# Patient Record
Sex: FEMALE | Race: OTHER
[De-identification: names, ages, dates, MRNs, and addresses within clinical notes are randomized per-mention and may not be internally consistent; named-entity substitution may affect disease eponyms.]

---

## 2022-04-26 PROBLEM — Z00.00 ENCOUNTER FOR PREVENTIVE HEALTH EXAMINATION: Status: ACTIVE | Noted: 2022-04-26

## 2022-05-31 ENCOUNTER — NON-APPOINTMENT (OUTPATIENT)
Age: 77
End: 2022-05-31

## 2022-05-31 ENCOUNTER — APPOINTMENT (OUTPATIENT)
Dept: CARDIOLOGY | Facility: CLINIC | Age: 77
End: 2022-05-31
Payer: MEDICARE

## 2022-05-31 VITALS
SYSTOLIC BLOOD PRESSURE: 114 MMHG | WEIGHT: 106 LBS | DIASTOLIC BLOOD PRESSURE: 60 MMHG | OXYGEN SATURATION: 97 % | HEIGHT: 67 IN | BODY MASS INDEX: 16.64 KG/M2 | HEART RATE: 80 BPM | TEMPERATURE: 97.6 F

## 2022-05-31 VITALS — DIASTOLIC BLOOD PRESSURE: 58 MMHG | SYSTOLIC BLOOD PRESSURE: 110 MMHG

## 2022-05-31 DIAGNOSIS — Z82.49 FAMILY HISTORY OF ISCHEMIC HEART DISEASE AND OTHER DISEASES OF THE CIRCULATORY SYSTEM: ICD-10-CM

## 2022-05-31 DIAGNOSIS — Z78.9 OTHER SPECIFIED HEALTH STATUS: ICD-10-CM

## 2022-05-31 DIAGNOSIS — Z80.0 FAMILY HISTORY OF MALIGNANT NEOPLASM OF DIGESTIVE ORGANS: ICD-10-CM

## 2022-05-31 PROCEDURE — 93242 EXT ECG>48HR<7D RECORDING: CPT

## 2022-05-31 PROCEDURE — 93000 ELECTROCARDIOGRAM COMPLETE: CPT | Mod: 59

## 2022-05-31 PROCEDURE — 99204 OFFICE O/P NEW MOD 45 MIN: CPT | Mod: 25

## 2022-05-31 RX ORDER — ASCORBIC ACID 500 MG
TABLET ORAL DAILY
Refills: 0 | Status: ACTIVE | COMMUNITY

## 2022-05-31 RX ORDER — UBIDECARENONE/VIT E ACET 100MG-5
CAPSULE ORAL DAILY
Refills: 0 | Status: ACTIVE | COMMUNITY

## 2022-06-01 NOTE — ASSESSMENT
[FreeTextEntry1] : Lower extremity swelling discoloration.  \par Atrial fibrillation.  Unclear duration.  \par Prior chest pain syndrome.  \par Discussed diagnosis natural history and pathophysiology of atrial fibrillation.  \par Monitor applied to evaluate heart rate.  \par Echocardiogram to evaluate resting cardiac structure and function. \par Anticoagulation.  [id number] score elevated due to age and gender.  Instructed to call if bleeding issues. \par Requested blood work.   \par Emphasized importance of medication compliance to reduce long-term cardiovascular risk, e.g. CVA\par Instructed to call if adverse effect.  \par Clinical follow-up will be scheduled after echocardiogram monitor and blood work.  \par Eventual ischemic evaluation.  Possible rhythm control based on above

## 2022-06-01 NOTE — HISTORY OF PRESENT ILLNESS
[FreeTextEntry1] : LONNY BILLS  is a 77 year old  F\par \par Presents today for initial cardiology consultation.  \par She reports that she is a "health freak".  She has not had no prior cardiovascular evaluation.  \par She had a remote appendectomy and tonsillectomy.  \par She does not take any medications.  \par There is no prior history of a coronary revascularization. \par There is no history of symptomatic congestive heart failure rheumatic heart disease or valvular disease.\par There is no significant family history.  \par \par She presents today with swelling and discoloration of her ankles.  She did an Internet search and was concerned about the development of heart failure.  \par Several years ago she thought she may have had a prior MI.  At that time she had chest discomfort and felt unable to breathe.  She saw an acupuncturist and had improvement in her symptoms.  \par \par At baseline she remains active walking doing elliptical and Nordic track up to 60 minutes.\par There is no exertional chest pain, pressure or discomfort. \par There is no significant dyspnea on exertion or orthopnea. \par There are no symptomatic palpitations, lightheadedness, dizziness or syncope.\par \par She works as a .  \par \par Today's EKG demonstrates atrial fibrillation.  This is rate controlled.  \par \par .

## 2022-06-08 ENCOUNTER — APPOINTMENT (OUTPATIENT)
Dept: CARDIOLOGY | Facility: CLINIC | Age: 77
End: 2022-06-08
Payer: MEDICARE

## 2022-06-08 PROCEDURE — 93306 TTE W/DOPPLER COMPLETE: CPT

## 2022-06-14 ENCOUNTER — APPOINTMENT (OUTPATIENT)
Dept: CARDIOLOGY | Facility: CLINIC | Age: 77
End: 2022-06-14

## 2022-06-14 PROCEDURE — 93244 EXT ECG>48HR<7D REV&INTERPJ: CPT

## 2022-06-20 ENCOUNTER — APPOINTMENT (OUTPATIENT)
Dept: CARDIOLOGY | Facility: CLINIC | Age: 77
End: 2022-06-20
Payer: MEDICARE

## 2022-06-20 VITALS
OXYGEN SATURATION: 99 % | HEIGHT: 67 IN | WEIGHT: 109 LBS | TEMPERATURE: 97.3 F | SYSTOLIC BLOOD PRESSURE: 100 MMHG | DIASTOLIC BLOOD PRESSURE: 50 MMHG | RESPIRATION RATE: 14 BRPM | HEART RATE: 67 BPM | BODY MASS INDEX: 17.11 KG/M2

## 2022-06-20 DIAGNOSIS — I48.92 UNSPECIFIED ATRIAL FIBRILLATION: ICD-10-CM

## 2022-06-20 DIAGNOSIS — I48.91 UNSPECIFIED ATRIAL FIBRILLATION: ICD-10-CM

## 2022-06-20 PROCEDURE — 99215 OFFICE O/P EST HI 40 MIN: CPT

## 2022-06-20 RX ORDER — METHYLDOPA/HYDROCHLOROTHIAZIDE 250MG-15MG
TABLET ORAL DAILY
Refills: 0 | Status: DISCONTINUED | COMMUNITY
End: 2022-06-20

## 2022-06-21 NOTE — HISTORY OF PRESENT ILLNESS
[FreeTextEntry1] : LONNY BILLS  is a 77 year old  F\par \par Presents today for initial cardiology consultation.  \par She reports that she is a "health freak".  She has not had no prior cardiovascular evaluation.  \par She had a remote appendectomy and tonsillectomy.  \par She does not take any medications.  \par There is no prior history of a coronary revascularization. \par There is no history of symptomatic congestive heart failure rheumatic heart disease or valvular disease.\par There is no significant family history.  \par \par She presents today with swelling and discoloration of her ankles.  She did an Internet search and was concerned about the development of heart failure.  On exam she has varicose veins. This has interim resolved on its own. \par Several years ago she thought she may have had a prior MI.  At that time she had chest discomfort and felt unable to breathe.  She saw an acupuncturist and had improvement in her symptoms.  \par \par At baseline she remains active walking doing elliptical and Nordic track up to 60 minutes.\par There is no exertional chest pain, pressure or discomfort. \par There is no significant dyspnea on exertion or orthopnea. \par There are no symptomatic palpitations, lightheadedness, dizziness or syncope.\par \par She works as a .  \par She sees a homeopathic practitioner routinely. She is averse to Western medicine. \par \par EKG demonstrates atrial fibrillation.  This is rate controlled.  \par Echo 6/8/22 EF 60% moderate MR, LAE\par 7 day monitor May 2022 persistent AFL avg 71 bpm. rare PVCs. \par Labs June 6, 2022 K4.8 creatinine 0.7 alk phos 127 AST 40 HDL 73 LDL 98  TSH 3.9 A1c 5.2  hemoglobin 14.6 platelets 102, LP(a) within normal limits\par

## 2022-06-21 NOTE — ASSESSMENT
[FreeTextEntry1] : LONNY BILLS is a 77 year old F who presents today Jun 20, 2022 with the above history and the following active issues: \par \par Lower extremity swelling discoloration.  \par Varicose veins on exam. \par Plans to see vascular speciailist for further eval. \par There is no clinical evidence of CHF. LVEF is normal on echo. \par Low salt, LE elevation/compression PRN. \par \par Atrial fibrillation. Persistent atrial flutter noted.  Unclear duration.  \par Discussed diagnosis natural history and pathophysiology of atrial fibrillation.  \par Rate is controlled. \par There is normal LV systolic function but mod LAE. \par Extensive discussion re rate vs rhythm control strategy, recommend EP consult to discuss ablation strategy given higher success rate in the setting of atrial flutter. \par [id number] score elevated due to age and gender.  Discussed elevated risk of CVA/VTE without use of AC. Discussed risks, benefits, and alternatives reviewed. DOAC v NOAC. \par She declines additional medications at this time. Declines EP eval or AC. Wishes to first discuss with her homeopathic practitioner and will call us for any change in opinion. \par She has been fully educated on risk of adverse cardiovascular events without appropriate medical therapy and followup. \par Offered 2nd opinion with outside cardiologist, will send records if needed. Pt will let us know. \par \par Prior chest pain syndrome.  No recurrence. \par Declines ischemic evaluation. Currently without anginal symptoms, normal EF.  \par Discussed exertional symptoms such as decreased exercise tolerance, chest discomfort, or shortness of breath which would warrant sooner evaluation/further investigation. \par \par Any questions and concerns by pt and  were addressed and resolved. \par \par Sincerely,\par \par SYLVAIN Posadas\par Patients history, testing, and plan reviewed with supervising MD: Dr. Seven Kauffman

## 2022-06-21 NOTE — ADDENDUM
[FreeTextEntry1] : Please note the patient was reviewed with NP Kaley Hackett.\par I was physically present during the service of the patient.\par I was directly involved in the management plan and recommendations of the care provided to the patient. \par I personally reviewed the history and physical examination as documented by the NP above.\par \par

## 2022-07-06 ENCOUNTER — APPOINTMENT (OUTPATIENT)
Dept: CARDIOLOGY | Facility: CLINIC | Age: 77
End: 2022-07-06

## 2022-07-26 ENCOUNTER — APPOINTMENT (OUTPATIENT)
Dept: PODIATRY | Facility: CLINIC | Age: 77
End: 2022-07-26

## 2022-07-26 DIAGNOSIS — M25.473 EFFUSION, UNSPECIFIED ANKLE: ICD-10-CM

## 2022-07-26 PROCEDURE — L3000: CPT

## 2022-07-29 ENCOUNTER — APPOINTMENT (OUTPATIENT)
Dept: PODIATRY | Facility: CLINIC | Age: 77
End: 2022-07-29

## 2022-08-15 PROBLEM — M25.473 ANKLE EDEMA: Status: ACTIVE | Noted: 2022-05-31

## 2023-08-22 ENCOUNTER — APPOINTMENT (OUTPATIENT)
Dept: CARDIOLOGY | Facility: CLINIC | Age: 78
End: 2023-08-22